# Patient Record
Sex: FEMALE | Race: AMERICAN INDIAN OR ALASKA NATIVE
[De-identification: names, ages, dates, MRNs, and addresses within clinical notes are randomized per-mention and may not be internally consistent; named-entity substitution may affect disease eponyms.]

---

## 2018-01-21 ENCOUNTER — HOSPITAL ENCOUNTER (EMERGENCY)
Dept: HOSPITAL 43 - DL.ED | Age: 83
Discharge: HOME | End: 2018-01-21
Payer: MEDICARE

## 2018-01-21 VITALS — DIASTOLIC BLOOD PRESSURE: 87 MMHG | SYSTOLIC BLOOD PRESSURE: 154 MMHG

## 2018-01-21 DIAGNOSIS — Z88.8: ICD-10-CM

## 2018-01-21 DIAGNOSIS — K21.9: Primary | ICD-10-CM

## 2018-01-21 DIAGNOSIS — Z88.1: ICD-10-CM

## 2018-01-21 DIAGNOSIS — Z79.899: ICD-10-CM

## 2018-01-21 DIAGNOSIS — Z79.82: ICD-10-CM

## 2018-01-21 LAB
ANION GAP SERPL CALC-SCNC: 13.4 MMOL/L
CHLORIDE SERPL-SCNC: 103 MMOL/L (ref 101–111)
SODIUM SERPL-SCNC: 138 MMOL/L (ref 135–145)

## 2018-01-21 PROCEDURE — 93010 ELECTROCARDIOGRAM REPORT: CPT

## 2018-01-21 PROCEDURE — 85025 COMPLETE CBC W/AUTO DIFF WBC: CPT

## 2018-01-21 PROCEDURE — 36415 COLL VENOUS BLD VENIPUNCTURE: CPT

## 2018-01-21 PROCEDURE — 80053 COMPREHEN METABOLIC PANEL: CPT

## 2018-01-21 PROCEDURE — 84484 ASSAY OF TROPONIN QUANT: CPT

## 2018-01-21 PROCEDURE — 99285 EMERGENCY DEPT VISIT HI MDM: CPT

## 2018-01-21 PROCEDURE — 93005 ELECTROCARDIOGRAM TRACING: CPT

## 2018-01-21 NOTE — EDM.PDOC
Scribed by Catia Jorgensen 01/21/18 1504 for Daylin Stephens NP





ED HPI GENERAL MEDICAL PROBLEM





- General


Chief Complaint: Chest Pain


Stated Complaint: [id number] CHEST PAINS


Time Seen by Provider: 01/21/18 13:35


Source of Information: Reports: Patient, RN, RN Notes Reviewed


History Limitations: Reports: No Limitations





- History of Present Illness


INITIAL COMMENTS - FREE TEXT/NARRATIVE: 


Patient presents to ER with complaint of low blood sugar. She states she took 

glucose pill for elevated blood sugar, which gave her an upset stomach. Admits 

to chest pain and nausea. She denies any shortness of breath, vomiting, diarrhea

, cough, fever or chills. She states the pain has decreased since arrival. 


Onset: Today


Duration: Constant


Location: Reports: Chest


Quality: Reports: Ache


Severity: Moderate


Improves with: Reports: None


Worsens with: Reports: None


Associated Symptoms: Reports: No Other Symptoms


  ** Mid-Sternal Epigastric


Pain Score (Numeric/FACES): 1





- Related Data


 Allergies











Allergy/AdvReac Type Severity Reaction Status Date / Time


 


atorvastatin calcium Allergy  Swelling Verified 04/16/15 06:44





[From Lipitor]     


 


erythromycin lactobionate Allergy  unknown Verified 04/16/15 06:44





[From Erythrocin]     


 


lisinopril Allergy  Cough Verified 04/16/15 06:44


 


fluoroquinolones Allergy  Swelling Uncoded 09/22/14 14:51











Home Meds: 


 Home Meds





Aspirin [Opal Chewable Aspirin] 81 mg PO DAILY 09/22/14 [History]


Bumetanide [Bumex] 1 mg PO DAILY 09/22/14 [History]


Fenofibric Acid (Choline) [Fenofibric Acid] 45 mg PO DAILY 09/22/14 [History]


Ferrous Gluconate 325 mg PO DAILY 09/22/14 [History]


Isosorbide Mononitrate [Isosorbide Mononitrate ER] 30 mg PO BID 09/22/14 [

History]


Levothyroxine [Synthroid] 50 mcg PO DAILY 09/22/14 [History]


Ranitidine HCl 150 mg PO BID PRN 09/22/14 [History]


Sertraline HCl 50 mg PO DAILY 09/22/14 [History]


amLODIPine [Norvasc] 5 mg PO DAILY 09/22/14 [History]


Insulin Detemir [Levemir Flextouch] 10 units INJECT .EVENING 04/16/15 [History]


Multivitamin [Multi-Vitamin Daily] 1 tab PO DAILY 04/16/15 [History]











Social & Family History





- Family History


Family Medical History: Noncontributory





- Tobacco Use


Smoking Status *Q: Never Smoker


Used Tobacco, but Quit: No


Second Hand Smoke Exposure: No





- Caffeine Use


Caffeine Use: Reports: Soda, Tea





- Alcohol Use


Days Per Week of Alcohol Use: 0





- Recreational Drug Use


Recreational Drug Use: No


Drug Use in Last 12 Months: No





- Living Situation & Occupation


Living situation: Reports: with Family


Occupation: Retired





ED ROS GENERAL





- Review of Systems


Review Of Systems: ROS reveals no pertinent complaints other than HPI.





ED EXAM, GENERAL





- Physical Exam


Exam: See Below


Exam Limited By: No Limitations


General Appearance: Alert, WD/WN, No Apparent Distress


Eye Exam: Bilateral Eye: Normal Inspection


Ears: Normal External Exam, Normal Canal, Hearing Grossly Normal, Normal TMs


Nose: Normal Inspection, Normal Mucosa, No Blood


Throat/Mouth: Normal Inspection, Normal Lips, Normal Teeth, Normal Gums, Normal 

Oropharynx, Normal Voice, No Airway Compromise


Head: Atraumatic, Normocephalic


Neck: Normal Inspection, Supple, Non-Tender, Full Range of Motion


Respiratory/Chest: No Respiratory Distress, Lungs Clear, Normal Breath Sounds, 

No Accessory Muscle Use, Chest Non-Tender


Cardiovascular: Normal Peripheral Pulses, Regular Rate, Rhythm, No Edema, No 

Gallop, No JVD, No Murmur, No Rub


GI/Abdominal: Normal Bowel Sounds, Soft, Non-Tender, No Organomegaly, No 

Distention, No Abnormal Bruit, No Mass


 (Female) Exam: Deferred


Rectal (Female) Exam: Deferred


Back Exam: Normal Inspection, Full Range of Motion, NT


Extremities: Normal Inspection, Normal Range of Motion, Non-Tender, Normal 

Capillary Refill, No Pedal Edema


Neurological: Alert, Oriented, CN II-XII Intact, Normal Cognition, Normal Gait, 

Normal Reflexes, No Motor/Sensory Deficits


Psychiatric: Normal Affect, Normal Mood


Skin Exam: Warm, Dry, Intact, Normal Color, No Rash


Lymphatic: No Adenopathy





EKG INTERPRETATION


EKG Date: 01/21/18


Time: 12:45


Rhythm: Other (sinus rhythm)


Rate (Beats/Min): 77


EKG Interpretation Comments: 


Left axis deviation. LVH with secondary repolarization abnormality. 





Course





- Vital Signs


Last Recorded V/S: 


 Last Vital Signs











Temp  97.2 F   01/21/18 12:56


 


Pulse  78   01/21/18 12:56


 


Resp  22 H  01/21/18 12:56


 


BP  154/87 H  01/21/18 12:56


 


Pulse Ox  91 L  01/21/18 12:56














- Orders/Labs/Meds


Orders: 


 Active Orders 24 hr











 Category Date Time Status


 


 EKG Documentation Completion [RC] STAT Care  01/21/18 13:42 Active


 


 Peripheral IV Care [RC] .AS DIRECTED Care  01/21/18 13:42 Active


 


 Sodium Chloride 0.9% [Saline Flush] Med  01/21/18 13:41 Active





 10 ml FLUSH ASDIRECTED PRN   


 


 Peripheral IV Insertion Adult [OM.PC] Stat Oth  01/21/18 13:41 Ordered








 Medication Orders





Sodium Chloride (Saline Flush)  10 ml FLUSH ASDIRECTED PRN


   PRN Reason: Keep Vein Open


   Last Admin: 01/21/18 13:49  Dose: 10 ml








Labs: 


 Laboratory Tests











  01/21/18 01/21/18 Range/Units





  12:52 12:52 


 


WBC   11.7 H  (5.0-10.0)  10^3/uL


 


RBC   4.42  (4.2-5.4)  10^6/uL


 


Hgb   13.7  (12.0-16.0)  g/dL


 


Hct   42.6  (37.0-47.0)  %


 


MCV   96.4  ()  fL


 


MCH   31.0  (27.0-34.0)  pg


 


MCHC   32.2 L  (33.0-35.0)  g/dL


 


Plt Count   226  (150-450)  10^3/uL


 


Neut % (Auto)   79.6 H  (42.2-75.2)  %


 


Lymph % (Auto)   14.4 L  (20.5-50.1)  %


 


Mono % (Auto)   4.9  (2-8)  %


 


Eos % (Auto)   0.9 L  (1.0-3.0)  %


 


Baso % (Auto)   0.2  (0.0-1.0)  %


 


Sodium  138   (135-145)  mmol/L


 


Potassium  4.4   (3.6-5.0)  mmol/L


 


Chloride  103   (101-111)  mmol/L


 


Carbon Dioxide  26.0   (21.0-31.0)  mmol/L


 


Anion Gap  13.4   


 


BUN  16   (7-18)  mg/dL


 


Creatinine  0.9   (0.6-1.3)  mg/dL


 


Est Cr Clr Drug Dosing  39.63   mL/min


 


Estimated GFR (MDRD)  59   


 


BUN/Creatinine Ratio  17.77   


 


Glucose  225 H   ()  mg/dL


 


Calcium  9.2   (8.4-10.2)  mg/dl


 


Total Bilirubin  0.7   (0.2-1.0)  mg/dL


 


AST  28   (10-42)  IU/L


 


ALT  17   (10-60)  IU/L


 


Alkaline Phosphatase  54   ()  IU/L


 


Troponin I  < 0.02   (0.00-0.02)  ng/ml


 


Total Protein  7.8   (6.7-8.2)  g/dl


 


Albumin  4.2   (3.2-5.5)  g/dl


 


Globulin  3.6   


 


Albumin/Globulin Ratio  1.17   











Meds: 


Medications











Generic Name Dose Route Start Last Admin





  Trade Name Freq  PRN Reason Stop Dose Admin


 


Sodium Chloride  10 ml  01/21/18 13:41  01/21/18 13:49





  Saline Flush  FLUSH   10 ml





  ASDIRECTED PRN   Administration





  Keep Vein Open   














Discontinued Medications














Generic Name Dose Route Start Last Admin





  Trade Name Freq  PRN Reason Stop Dose Admin


 


Al Hydroxide/Mg Hydroxide  30 ml  01/21/18 14:48  01/21/18 14:58





  Gi Cocktail  PO  01/21/18 14:49  30 ml





  ONETIME ONE   Administration














Departure





- Departure


Time of Disposition: 15:02


Disposition: Home, Self-Care 01


Condition: Fair


Clinical Impression: 


Acid reflux


Qualifiers:


 Esophagitis presence: without esophagitis Qualified Code(s): K21.9 - Gastro-

esophageal reflux disease without esophagitis





Instructions:  Nonspecific Chest Pain, Easy-to-Read, Indigestion, Easy-to-Read, 

Heartburn, Easy-to-Read


Forms:  ED Department Discharge


Additional Instructions: 


May use over the counter pepcid ac as directed for indigestion or heartburn as 

directed 


Follow up with your primary care facility


Avoid spicy foods, caffeine, coffee, chocolate and other irritants. 





- My Orders


Last 24 Hours: 


My Active Orders





01/21/18 13:41


Sodium Chloride 0.9% [Saline Flush]   10 ml FLUSH ASDIRECTED PRN 


Peripheral IV Insertion Adult [OM.PC] Stat 





01/21/18 13:42


EKG Documentation Completion [RC] STAT 


Peripheral IV Care [RC] .AS DIRECTED 














- Assessment/Plan


Last 24 Hours: 


My Active Orders





01/21/18 13:41


Sodium Chloride 0.9% [Saline Flush]   10 ml FLUSH ASDIRECTED PRN 


Peripheral IV Insertion Adult [OM.PC] Stat 





01/21/18 13:42


EKG Documentation Completion [RC] STAT 


Peripheral IV Care [RC] .AS DIRECTED 














I have read and agree with the documentation that has been completed regarding 

this visit. By signing this record, I attest that the documentation was 

completed in my physical presence and is an accurate record of the encounter.

## 2018-01-23 NOTE — EKG
01/21/2018 - MICHEAL SALGUERO -

 

FINDINGS:  EKG per my reading shows sinus rhythm with LVH.

 

DD:  01/23/2018 12:53:59

DT:  01/23/2018 13:06:38

[facility]

Job #:  [id number]

## 2019-12-14 NOTE — EDM.PDOC
Scribed by Catia Jorgensen 12/14/19 [id number] for Eunice Vora NP





ED HPI GENERAL MEDICAL PROBLEM





- General


Chief Complaint: General


Stated Complaint: AMBULANCE


Time Seen by Provider: 12/14/19 14:21


Source of Information: Reports: Patient, EMS, EMS Notes Reviewed, RN, RN Notes 

Reviewed


History Limitations: Reports: No Limitations





- History of Present Illness


INITIAL COMMENTS - FREE TEXT/NARRATIVE: 


Patient presents to ER by [facility]. Patient does not know 

if her blood sugar is elevated. She is weak x 1 days. She has had decreased 

appetite. She has had no cough, fever, or urinary symptoms. No recent illness. 

She is on Levemir 22 units in the A.M. Her son gives her all her insulin and 

meds; they dont check her BS. Son was knowledgeable about her Levemir. 


NO cough cold or SOB. NO abdominal pain, polyuria, frequency or dysuria.  


Onset: Gradual


Duration: Getting Worse


Location: Reports: Generalized


Quality: Reports: Ache


Severity: Moderate


Improves with: Reports: None


Worsens with: Reports: None


Associated Symptoms: Reports: No Other Symptoms





- Related Data


 Allergies











Allergy/AdvReac Type Severity Reaction Status Date / Time


 


atorvastatin calcium Allergy  Swelling Verified 12/14/19 14:38





[From Lipitor]     


 


erythromycin lactobionate Allergy  unknown Verified 12/14/19 14:38





[From Erythrocin]     


 


lisinopril Allergy  Cough Verified 12/14/19 14:38


 


metformin Allergy  Itching Verified 12/14/19 14:38


 


fluoroquinolones Allergy  Swelling Uncoded 12/05/19 13:52











Home Meds: 


 Home Meds





Aspirin [Opal Chewable Aspirin] 81 mg PO DAILY 09/22/14 [History]


Bumetanide [Bumex] 1 mg PO DAILY 09/22/14 [History]


Fenofibric Acid (Choline) [Fenofibric Acid] 45 mg PO DAILY 09/22/14 [History]


Ferrous Gluconate 325 mg PO DAILY 09/22/14 [History]


Isosorbide Mononitrate [Isosorbide Mononitrate ER] 15 mg PO BID 09/22/14 [

History]


Levothyroxine [Synthroid] 0.075 mg PO DAILY 09/22/14 [History]


Ranitidine HCl 150 mg PO BID PRN 09/22/14 [History]


Sertraline HCl 50 mg PO DAILY 09/22/14 [History]


amLODIPine [Norvasc] 5 mg PO DAILY 09/22/14 [History]


Insulin Detemir [Levemir Flextouch] 22 units INJECT .MORNINGS 04/16/15 [History]


Multivitamin [Multi-Vitamin Daily] 1 tab PO DAILY 04/16/15 [History]


Gabapentin [Neurontin] 300 mg PO BID 11/08/18 [History]


Ibuprofen 400 mg PO ATDISCHARGE PRN 11/08/18 [History]


atorvaSTATin [Lipitor] 20 mg PO BEDTIME 11/08/18 [History]


Donepezil HCl 10 mg PO DAILY 12/05/19 [History]


Furosemide 10 mg PO BID 12/05/19 [History]


hydroCHLOROthiazide [Hydrochlorothiazide] 12.5 mg PO DAILY 12/05/19 [History]











Past Medical History


HEENT History: Reports: Cataract, Glaucoma, Impaired Vision, Other (See Below)


Cardiovascular History: Reports: High Cholesterol, Hypertension


Respiratory History: Reports: None


Gastrointestinal History: Reports: GERD, Other (See Below)


Genitourinary History: Reports: Chronic Renal Insuffiency


OB/GYN History: Reports: Pregnancy


Musculoskeletal History: Reports: Back Pain, Chronic, Osteoarthritis, Other (

See Below)


Other Musculoskeletal History: right ankle surgery. Hip joint pain bilateral


Neurological History: Reports: None


Psychiatric History: Reports: Depression


Endocrine/Metabolic History: Reports: Diabetes, Type II, Hypothyroidism, Obesity

/BMI 30+


Hematologic History: Reports: None


Immunologic History: Reports: None


Oncologic (Cancer) History: Reports: None


Dermatologic History: Reports: None





- Infectious Disease History


Infectious Disease History: Reports: None





- Past Surgical History


HEENT Surgical History: Reports: Cataract Surgery, Oral Surgery, Tonsillectomy


GI Surgical History: Reports: Cholecystectomy, Colonoscopy, EGD


Female  Surgical History: Reports: Tubal Ligation





Social & Family History





- Family History


Family Medical History: Noncontributory





- Tobacco Use


Smoking Status *Q: Never Smoker





- Caffeine Use


Caffeine Use: Reports: Coffee





- Recreational Drug Use


Recreational Drug Use: No





- Living Situation & Occupation


Living situation: Reports: with Family


Occupation: Retired





ED ROS GENERAL





- Review of Systems


Review Of Systems: Comprehensive ROS is negative, except as noted in HPI.





ED EXAM, GENERAL





- Physical Exam


Exam: See Below


Exam Limited By: No Limitations


General Appearance: Alert, WD/WN, No Apparent Distress


Eye Exam: Bilateral Eye: Normal Inspection


Ears: Normal External Exam, Normal Canal, Hearing Grossly Normal, Normal TMs


Nose: Normal Inspection, Normal Mucosa, No Blood


Throat/Mouth: Normal Inspection, Normal Lips, Normal Teeth, Normal Gums, Normal 

Oropharynx, Normal Voice, No Airway Compromise


Head: Atraumatic, Normocephalic


Neck: Normal Inspection, Supple, Non-Tender, Full Range of Motion


Respiratory/Chest: No Respiratory Distress, Lungs Clear, Normal Breath Sounds, 

No Accessory Muscle Use, Chest Non-Tender


Cardiovascular: Normal Peripheral Pulses, Regular Rate, Rhythm, No Edema, No 

Gallop, No JVD, No Murmur, No Rub


GI/Abdominal: Normal Bowel Sounds, Soft, Non-Tender, No Organomegaly, No 

Distention, No Abnormal Bruit, No Mass


 (Female) Exam: Deferred


Rectal (Female) Exam: Deferred


Back Exam: Normal Inspection, Full Range of Motion, NT


Extremities: Normal Inspection, Normal Range of Motion, Non-Tender, Normal 

Capillary Refill, No Pedal Edema


Neurological: Alert, Oriented, CN II-XII Intact, Normal Cognition, Normal Gait, 

Normal Reflexes, No Motor/Sensory Deficits


Psychiatric: Normal Affect, Normal Mood


Skin Exam: Warm, Dry, Intact, Normal Color, No Rash





Course





- Vital Signs


Last Recorded V/S: 


 Last Vital Signs











Temp  36.1 C   12/14/19 14:07


 


Pulse  64   12/14/19 14:07


 


Resp  22 H  12/14/19 14:07


 


BP  146/69 H  12/14/19 14:07


 


Pulse Ox  95   12/14/19 14:07














- Orders/Labs/Meds


Orders: 


 Active Orders 24 hr











 Category Date Time Status


 


 Blood Glucose Check, Bedside [RC] ONETIME Care  12/14/19 15:43 Active


 


 Late Tray [DIET] Routine Diet  12/14/19 17:16 Active


 


 CULTURE BLOOD [BC] Stat Lab  12/14/19 14:08 Received











Labs: 


 Laboratory Tests











  12/14/19 12/14/19 12/14/19 Range/Units





  14:08 14:08 14:08 


 


WBC  8.0    (5.0-10.0)  10^3/uL


 


RBC  4.04 L    (4.2-5.4)  10^6/uL


 


Hgb  12.6    (12.0-16.0)  g/dL


 


Hct  38.3    (37.0-47.0)  %


 


MCV  94.8    ()  fL


 


MCH  31.2    (27.0-34.0)  pg


 


MCHC  32.9 L    (33.0-35.0)  g/dL


 


Plt Count  194    (150-450)  10^3/uL


 


Neut % (Auto)  71.2    (42.2-75.2)  %


 


Lymph % (Auto)  18.6 L    (20.5-50.1)  %


 


Mono % (Auto)  7.6    (2-8)  %


 


Eos % (Auto)  2.1    (1.0-3.0)  %


 


Baso % (Auto)  0.5    (0.0-1.0)  %


 


Sodium   135   (135-145)  mmol/L


 


Potassium   4.6   (3.6-5.0)  mmol/L


 


Chloride   99 L   (101-111)  mmol/L


 


Carbon Dioxide   27.0   (21.0-31.0)  mmol/L


 


Anion Gap   13.6   


 


BUN   26 H   (7-18)  mg/dL


 


Creatinine   0.9   (0.6-1.3)  mg/dL


 


Est Cr Clr Drug Dosing   38.13   mL/min


 


Estimated GFR (MDRD)   59   


 


BUN/Creatinine Ratio   28.88   


 


Glucose   422 H*   ()  mg/dL


 


POC Glucose     ()  mg/dl


 


Lactic Acid    2.0  (0.5-2.2)  mmol/L


 


Calcium   9.1   (8.4-10.2)  mg/dl


 


Total Bilirubin   0.5   (0.2-1.0)  mg/dL


 


AST   16   (10-42)  IU/L


 


ALT   16   (10-60)  IU/L


 


Alkaline Phosphatase   61   ()  IU/L


 


Total Protein   7.0   (6.7-8.2)  g/dl


 


Albumin   3.9   (3.2-5.5)  g/dl


 


Globulin   3.1   


 


Albumin/Globulin Ratio   1.26   


 


Urine Color     (YELLOW)  


 


Urine Appearance     (CLEAR)  


 


Urine pH     (5.0-9.0)  


 


Ur Specific Gravity     (1.005-1.030)  


 


Urine Protein     (NEGATIVE)  


 


Urine Glucose (UA)     (NEGATIVE)  


 


Urine Ketones     (NEGATIVE)  


 


Urine Occult Blood     (NEGATIVE)  


 


Urine Nitrite     (NEGATIVE)  


 


Urine Bilirubin     (NEGATIVE)  


 


Urine Urobilinogen     (0.2-1.0)  mg/dL


 


Ur Leukocyte Esterase     (NEGATIVE)  














  12/14/19 12/14/19 12/14/19 Range/Units





  14:09 15:04 15:42 


 


WBC     (5.0-10.0)  10^3/uL


 


RBC     (4.2-5.4)  10^6/uL


 


Hgb     (12.0-16.0)  g/dL


 


Hct     (37.0-47.0)  %


 


MCV     ()  fL


 


MCH     (27.0-34.0)  pg


 


MCHC     (33.0-35.0)  g/dL


 


Plt Count     (150-450)  10^3/uL


 


Neut % (Auto)     (42.2-75.2)  %


 


Lymph % (Auto)     (20.5-50.1)  %


 


Mono % (Auto)     (2-8)  %


 


Eos % (Auto)     (1.0-3.0)  %


 


Baso % (Auto)     (0.0-1.0)  %


 


Sodium     (135-145)  mmol/L


 


Potassium     (3.6-5.0)  mmol/L


 


Chloride     (101-111)  mmol/L


 


Carbon Dioxide     (21.0-31.0)  mmol/L


 


Anion Gap     


 


BUN     (7-18)  mg/dL


 


Creatinine     (0.6-1.3)  mg/dL


 


Est Cr Clr Drug Dosing     mL/min


 


Estimated GFR (MDRD)     


 


BUN/Creatinine Ratio     


 


Glucose     ()  mg/dL


 


POC Glucose  375 H   301 H  ()  mg/dl


 


Lactic Acid     (0.5-2.2)  mmol/L


 


Calcium     (8.4-10.2)  mg/dl


 


Total Bilirubin     (0.2-1.0)  mg/dL


 


AST     (10-42)  IU/L


 


ALT     (10-60)  IU/L


 


Alkaline Phosphatase     ()  IU/L


 


Total Protein     (6.7-8.2)  g/dl


 


Albumin     (3.2-5.5)  g/dl


 


Globulin     


 


Albumin/Globulin Ratio     


 


Urine Color   Yellow   (YELLOW)  


 


Urine Appearance   Clear   (CLEAR)  


 


Urine pH   5.5   (5.0-9.0)  


 


Ur Specific Gravity   1.015   (1.005-1.030)  


 


Urine Protein   Negative   (NEGATIVE)  


 


Urine Glucose (UA)   500 H   (NEGATIVE)  


 


Urine Ketones   Negative   (NEGATIVE)  


 


Urine Occult Blood   Negative   (NEGATIVE)  


 


Urine Nitrite   Negative   (NEGATIVE)  


 


Urine Bilirubin   Negative   (NEGATIVE)  


 


Urine Urobilinogen   0.2   (0.2-1.0)  mg/dL


 


Ur Leukocyte Esterase   Negative   (NEGATIVE)  














  12/14/19 Range/Units





  16:25 


 


WBC   (5.0-10.0)  10^3/uL


 


RBC   (4.2-5.4)  10^6/uL


 


Hgb   (12.0-16.0)  g/dL


 


Hct   (37.0-47.0)  %


 


MCV   ()  fL


 


MCH   (27.0-34.0)  pg


 


MCHC   (33.0-35.0)  g/dL


 


Plt Count   (150-450)  10^3/uL


 


Neut % (Auto)   (42.2-75.2)  %


 


Lymph % (Auto)   (20.5-50.1)  %


 


Mono % (Auto)   (2-8)  %


 


Eos % (Auto)   (1.0-3.0)  %


 


Baso % (Auto)   (0.0-1.0)  %


 


Sodium   (135-145)  mmol/L


 


Potassium   (3.6-5.0)  mmol/L


 


Chloride   (101-111)  mmol/L


 


Carbon Dioxide   (21.0-31.0)  mmol/L


 


Anion Gap   


 


BUN   (7-18)  mg/dL


 


Creatinine   (0.6-1.3)  mg/dL


 


Est Cr Clr Drug Dosing   mL/min


 


Estimated GFR (MDRD)   


 


BUN/Creatinine Ratio   


 


Glucose   ()  mg/dL


 


POC Glucose  233 H  ()  mg/dl


 


Lactic Acid   (0.5-2.2)  mmol/L


 


Calcium   (8.4-10.2)  mg/dl


 


Total Bilirubin   (0.2-1.0)  mg/dL


 


AST   (10-42)  IU/L


 


ALT   (10-60)  IU/L


 


Alkaline Phosphatase   ()  IU/L


 


Total Protein   (6.7-8.2)  g/dl


 


Albumin   (3.2-5.5)  g/dl


 


Globulin   


 


Albumin/Globulin Ratio   


 


Urine Color   (YELLOW)  


 


Urine Appearance   (CLEAR)  


 


Urine pH   (5.0-9.0)  


 


Ur Specific Gravity   (1.005-1.030)  


 


Urine Protein   (NEGATIVE)  


 


Urine Glucose (UA)   (NEGATIVE)  


 


Urine Ketones   (NEGATIVE)  


 


Urine Occult Blood   (NEGATIVE)  


 


Urine Nitrite   (NEGATIVE)  


 


Urine Bilirubin   (NEGATIVE)  


 


Urine Urobilinogen   (0.2-1.0)  mg/dL


 


Ur Leukocyte Esterase   (NEGATIVE)  











Meds: 


Medications














Discontinued Medications














Generic Name Dose Route Start Last Admin





  Trade Name Kaity  PRN Reason Stop Dose Admin


 


Insulin Human Regular  5 unit  12/14/19 14:57  12/14/19 15:11





  Humulin R  IV  12/14/19 14:58  5 unit





  ONETIME ONE   Administration





     





     





     





     


 


Insulin Human Regular  4 unit  12/14/19 16:15  12/14/19 16:29





  Humulin R  SUBCUT  12/14/19 16:16  Not Given





  ONETIME ONE   





     





     





     





     


 


Insulin Human Regular  2 unit  12/14/19 16:27  12/14/19 16:29





  Humulin R  SUBCUT  12/14/19 16:28  2 unit





  ONETIME ONE   Administration





     





     





     





     














- Re-Assessments/Exams


Free Text/Narrative Re-Assessment/Exam: 





12/14/19 20:23


No signs of infection on labs, exam normal. Had not identified increased carb 

intake


Most likely she is just requiring more insulin and with them not taking her BS; 

may not have noted her BS rising. 


Upon admit her BS was 422 given 5 units IV reg insulin. After 2 hours her BS 

down to 233; given 2 units sq. 


Will increase her Levemir to 26 units and to follow up with her PCP next week. 

Son understood and so did the patient. Encouraged them to check BS bid and 

report to the PCP. Also discussed sx of low blood sugar. 





Departure





- Departure


Time of Disposition: 16:17


Disposition: Home, Self-Care 01


Condition: Good


Clinical Impression: 


 Hyperglycemia, IDDM (insulin dependent diabetes mellitus)








- Discharge Information


Instructions:  Type 2 Diabetes Mellitus, Diagnosis, Adult, Insulin Treatment 

for Diabetes Mellitus, Tips for Eating Away From Home If You Have Diabetes, 

Type 2 Diabetes Mellitus, Self Care, Adult, Blood Glucose Monitoring, Adult


Referrals: 


PCP,None [Primary Care Provider] - 


Forms:  ED Department Discharge


Additional Instructions: 


Would be monitoring her blood sugar twice a day; keep a log for your primary 

care. Make appointment as soon as possible. we gave you some insulin in the ER 

as your blood sugar was increased to 422. 


We will need to increase your Levemir to 26 units every am. If any issues with 

low blood sugars < 100; then hold Levemir and call you PCP or go to the ER. 


We did not find any reasons for your high blood sugar. You blood work is good 

and no infection in urine. 





Sepsis Event Note





- Evaluation


Sepsis Screening Result: No Definite Risk





- Focused Exam


Vital Signs: 


 Vital Signs











  Temp Pulse Resp BP Pulse Ox


 


 12/14/19 14:07  36.1 C  64  22 H  146/69 H  95











Date Exam was Performed: 12/14/19


Time Exam was Performed: 20:21





- My Orders


Last 24 Hours: 


My Active Orders





12/14/19 14:08


CULTURE BLOOD [BC] Stat 





12/14/19 15:43


Blood Glucose Check, Bedside [RC] ONETIME 





12/14/19 17:16


Late Tray [DIET] Routine 














- Assessment/Plan


Last 24 Hours: 


My Active Orders





12/14/19 14:08


CULTURE BLOOD [BC] Stat 





12/14/19 15:43


Blood Glucose Check, Bedside [RC] ONETIME 





12/14/19 17:16


Late Tray [DIET] Routine 














I have read and agree with the documentation that has been completed regarding 

this visit. By signing this record, I attest that the documentation was 

completed in my physical presence and is an accurate record of the encounter.

## 2019-12-31 NOTE — EDM.PDOC
Scribed by Catia Jorgensen 12/31/19 0610 for Gautam Adame PA





ED HPI GENERAL MEDICAL PROBLEM





- General


Chief Complaint: General


Stated Complaint: DIABETIC COMPLAINT


Time Seen by Provider: 12/31/19 12:47


Source of Information: Reports: Patient, EMS, EMS Notes Reviewed, RN, RN Notes 

Reviewed


History Limitations: Reports: No Limitations





- History of Present Illness


INITIAL COMMENTS - FREE TEXT/NARRATIVE: 


Patient is an 89-year-old who presents to ER by [facility] 

who woke up feeling well. She then got a headache and diffuse abdominal pain. 

She has taken no medications for her pain. She did take her regular 

medications. She had a clinic visit weeks ago. She has had no vomiting. Patient 

had a normal bowel movement movement with no urinary complaints. 


Onset: Today


Duration: Getting Worse


Location: Reports: Head, Abdomen


Quality: Reports: Ache


Severity: Moderate


Improves with: Reports: None


Worsens with: Reports: None


Associated Symptoms: Reports: No Other Symptoms





- Related Data


 Allergies











Allergy/AdvReac Type Severity Reaction Status Date / Time


 


atorvastatin calcium Allergy  Swelling Verified 12/14/19 14:38





[From Lipitor]     


 


erythromycin lactobionate Allergy  unknown Verified 12/14/19 14:38





[From Erythrocin]     


 


lisinopril Allergy  Cough Verified 12/14/19 14:38


 


metformin Allergy  Itching Verified 12/14/19 14:38


 


fluoroquinolones Allergy  Swelling Uncoded 12/05/19 13:52











Home Meds: 


 Home Meds





Aspirin [Opal Chewable Aspirin] 81 mg PO DAILY 09/22/14 [History]


Bumetanide [Bumex] 1 mg PO DAILY 09/22/14 [History]


Fenofibric Acid (Choline) [Fenofibric Acid] 45 mg PO DAILY 09/22/14 [History]


Ferrous Gluconate 325 mg PO DAILY 09/22/14 [History]


Isosorbide Mononitrate [Isosorbide Mononitrate ER] 15 mg PO BID 09/22/14 [

History]


Levothyroxine [Synthroid] 0.075 mg PO DAILY 09/22/14 [History]


Ranitidine HCl 150 mg PO BID PRN 09/22/14 [History]


Sertraline HCl 50 mg PO DAILY 09/22/14 [History]


amLODIPine [Norvasc] 5 mg PO DAILY 09/22/14 [History]


Insulin Detemir [Levemir Flextouch] 22 units INJECT .MORNINGS 04/16/15 [History]


Multivitamin [Multi-Vitamin Daily] 1 tab PO DAILY 04/16/15 [History]


Gabapentin [Neurontin] 300 mg PO BID 11/08/18 [History]


Ibuprofen 400 mg PO ATDISCHARGE PRN 11/08/18 [History]


atorvaSTATin [Lipitor] 20 mg PO BEDTIME 11/08/18 [History]


Donepezil HCl 10 mg PO DAILY 12/05/19 [History]


Furosemide 10 mg PO BID 12/05/19 [History]


hydroCHLOROthiazide [Hydrochlorothiazide] 12.5 mg PO DAILY 12/05/19 [History]











Past Medical History


HEENT History: Reports: Cataract, Glaucoma, Impaired Vision, Other (See Below)


Cardiovascular History: Reports: High Cholesterol, Hypertension


Respiratory History: Reports: None


Gastrointestinal History: Reports: GERD, Other (See Below)


Genitourinary History: Reports: Chronic Renal Insuffiency


OB/GYN History: Reports: Pregnancy


Musculoskeletal History: Reports: Back Pain, Chronic, Osteoarthritis, Other (

See Below)


Other Musculoskeletal History: right ankle surgery. Hip joint pain bilateral


Neurological History: Reports: None


Psychiatric History: Reports: Depression


Endocrine/Metabolic History: Reports: Diabetes, Type II, Hypothyroidism, Obesity

/BMI 30+


Hematologic History: Reports: None


Immunologic History: Reports: None


Oncologic (Cancer) History: Reports: None


Dermatologic History: Reports: None





- Infectious Disease History


Infectious Disease History: Reports: None





- Past Surgical History


HEENT Surgical History: Reports: Cataract Surgery, Oral Surgery, Tonsillectomy


GI Surgical History: Reports: Cholecystectomy, Colonoscopy, EGD


Female  Surgical History: Reports: Tubal Ligation





Social & Family History





- Family History


Family Medical History: Noncontributory





- Caffeine Use


Caffeine Use: Reports: Coffee





- Living Situation & Occupation


Living situation: Reports: with Family


Occupation: Retired





ED ROS GENERAL





- Review of Systems


Review Of Systems: Comprehensive ROS is negative, except as noted in HPI.





ED EXAM, GENERAL





- Physical Exam


Exam: See Below


Exam Limited By: No Limitations


General Appearance: Obese


Eye Exam: Bilateral Eye: EOMI, Normal Inspection, PERRL


Ears: Normal External Exam, Normal Canal, Hearing Grossly Normal, Normal TMs


Nose: Normal Inspection, Normal Mucosa, No Blood


Throat/Mouth: Normal Inspection, Normal Lips, Normal Teeth, Normal Gums, Normal 

Oropharynx, Normal Voice, No Airway Compromise


Head: Atraumatic, Normocephalic


Neck: Normal Inspection, Supple, Non-Tender, Full Range of Motion


Respiratory/Chest: No Respiratory Distress, Lungs Clear, Normal Breath Sounds, 

No Accessory Muscle Use, Chest Non-Tender


Cardiovascular: Normal Peripheral Pulses, Regular Rate, Rhythm, No Edema, No 

Gallop, No JVD, No Murmur, No Rub


GI/Abdominal: Other (morbid obesity. Right sided abdominal pain)


 (Female) Exam: Deferred


Rectal (Female) Exam: Deferred


Back Exam: Normal Inspection, Full Range of Motion, NT


Extremities: Normal Inspection, Normal Range of Motion, Non-Tender, Normal 

Capillary Refill, No Pedal Edema


Neurological: Alert, Oriented, CN II-XII Intact, Normal Cognition, Normal Gait, 

Normal Reflexes, No Motor/Sensory Deficits


Psychiatric: Normal Affect, Normal Mood


Skin Exam: Warm, Dry, Intact, Normal Color, No Rash


Lymphatic: No Adenopathy





Course





- Vital Signs


Last Recorded V/S: 


 Last Vital Signs











Temp  36.1 C   12/31/19 12:42


 


Pulse  73   12/31/19 12:42


 


Resp  20   12/31/19 12:42


 


BP  119/41 L  12/31/19 12:42


 


Pulse Ox  88 L  12/31/19 12:42














- Orders/Labs/Meds


Labs: 


 Laboratory Tests











  12/31/19 12/31/19 12/31/19 Range/Units





  13:32 13:32 13:32 


 


WBC    8.5  (5.0-10.0)  10^3/uL


 


RBC    3.97 L  (4.2-5.4)  10^6/uL


 


Hgb    12.2  (12.0-16.0)  g/dL


 


Hct    37.5  (37.0-47.0)  %


 


MCV    94.5  ()  fL


 


MCH    30.7  (27.0-34.0)  pg


 


MCHC    32.5 L  (33.0-35.0)  g/dL


 


Plt Count    192  (150-450)  10^3/uL


 


Neut % (Auto)    70.2  (42.2-75.2)  %


 


Lymph % (Auto)    19.4 L  (20.5-50.1)  %


 


Mono % (Auto)    8.3 H  (2-8)  %


 


Eos % (Auto)    1.5  (1.0-3.0)  %


 


Baso % (Auto)    0.6  (0.0-1.0)  %


 


Sodium     (135-145)  mmol/L


 


Potassium     (3.6-5.0)  mmol/L


 


Chloride     (101-111)  mmol/L


 


Carbon Dioxide     (21.0-31.0)  mmol/L


 


Anion Gap     


 


BUN     (7-18)  mg/dL


 


Creatinine     (0.6-1.3)  mg/dL


 


Est Cr Clr Drug Dosing     mL/min


 


Estimated GFR (MDRD)     


 


BUN/Creatinine Ratio     


 


Glucose     ()  mg/dL


 


Calcium     (8.4-10.2)  mg/dl


 


Magnesium  1.6 L    (1.8-2.5)  mg/dL


 


Total Bilirubin     (0.2-1.0)  mg/dL


 


AST     (10-42)  IU/L


 


ALT     (10-60)  IU/L


 


Alkaline Phosphatase     ()  IU/L


 


Ammonia   23   (11-35)  umol/L


 


Total Protein     (6.7-8.2)  g/dl


 


Albumin     (3.2-5.5)  g/dl


 


Globulin     


 


Albumin/Globulin Ratio     


 


Amylase  37    ()  U/L


 


Lipase  29    (22-51)  U/L


 


Urine Color     (YELLOW)  


 


Urine Appearance     (CLEAR)  


 


Urine pH     (5.0-9.0)  


 


Ur Specific Gravity     (1.005-1.030)  


 


Urine Protein     (NEGATIVE)  


 


Urine Glucose (UA)     (NEGATIVE)  


 


Urine Ketones     (NEGATIVE)  


 


Urine Occult Blood     (NEGATIVE)  


 


Urine Nitrite     (NEGATIVE)  


 


Urine Bilirubin     (NEGATIVE)  


 


Urine Urobilinogen     (0.2-1.0)  mg/dL


 


Ur Leukocyte Esterase     (NEGATIVE)  














  12/31/19 12/31/19 Range/Units





  13:32 13:46 


 


WBC    (5.0-10.0)  10^3/uL


 


RBC    (4.2-5.4)  10^6/uL


 


Hgb    (12.0-16.0)  g/dL


 


Hct    (37.0-47.0)  %


 


MCV    ()  fL


 


MCH    (27.0-34.0)  pg


 


MCHC    (33.0-35.0)  g/dL


 


Plt Count    (150-450)  10^3/uL


 


Neut % (Auto)    (42.2-75.2)  %


 


Lymph % (Auto)    (20.5-50.1)  %


 


Mono % (Auto)    (2-8)  %


 


Eos % (Auto)    (1.0-3.0)  %


 


Baso % (Auto)    (0.0-1.0)  %


 


Sodium  135   (135-145)  mmol/L


 


Potassium  4.0   (3.6-5.0)  mmol/L


 


Chloride  99 L   (101-111)  mmol/L


 


Carbon Dioxide  24.0   (21.0-31.0)  mmol/L


 


Anion Gap  16.0   


 


BUN  28 H   (7-18)  mg/dL


 


Creatinine  1.1   (0.6-1.3)  mg/dL


 


Est Cr Clr Drug Dosing  27.42   mL/min


 


Estimated GFR (MDRD)  47   


 


BUN/Creatinine Ratio  25.45   


 


Glucose  265 H   ()  mg/dL


 


Calcium  8.6   (8.4-10.2)  mg/dl


 


Magnesium    (1.8-2.5)  mg/dL


 


Total Bilirubin  0.6   (0.2-1.0)  mg/dL


 


AST  19   (10-42)  IU/L


 


ALT  16   (10-60)  IU/L


 


Alkaline Phosphatase  54   ()  IU/L


 


Ammonia    (11-35)  umol/L


 


Total Protein  6.9   (6.7-8.2)  g/dl


 


Albumin  3.9   (3.2-5.5)  g/dl


 


Globulin  3.0   


 


Albumin/Globulin Ratio  1.30   


 


Amylase    ()  U/L


 


Lipase    (22-51)  U/L


 


Urine Color   Yellow  (YELLOW)  


 


Urine Appearance   Clear  (CLEAR)  


 


Urine pH   5.0  (5.0-9.0)  


 


Ur Specific Gravity   1.015  (1.005-1.030)  


 


Urine Protein   Negative  (NEGATIVE)  


 


Urine Glucose (UA)   Negative  (NEGATIVE)  


 


Urine Ketones   Negative  (NEGATIVE)  


 


Urine Occult Blood   Negative  (NEGATIVE)  


 


Urine Nitrite   Negative  (NEGATIVE)  


 


Urine Bilirubin   Negative  (NEGATIVE)  


 


Urine Urobilinogen   0.2  (0.2-1.0)  mg/dL


 


Ur Leukocyte Esterase   Negative  (NEGATIVE)  














Departure





- Departure


Time of Disposition: 14:45


Disposition: Home, Self-Care 01


Condition: Fair


Clinical Impression: 


 Gastroenteritis








- Discharge Information


*PRESCRIPTION DRUG MONITORING PROGRAM REVIEWED*: Not Applicable


*COPY OF PRESCRIPTION DRUG MONITORING REPORT IN PATIENT SHYAM: Not Applicable


Instructions:  Viral Gastroenteritis, Adult, Easy-to-Read


Forms:  ED Department Discharge


Care Plan Goals: 


The patient was advised of the examination and lab results during the visit. 

The patient was encouraged to stick to a BRAT diet (Bananas, Rice, Applesauce 

and Toast) for the next 24-48 hours with small frequent sips of fluid. If the 

patient has any additional symptoms or concerns, the patient should either 

return to the emergency department or visit her primary care facility. 





Sepsis Event Note





- Evaluation


Sepsis Screening Result: No Definite Risk





- Focused Exam


Vital Signs: 


 Vital Signs











  Temp Pulse Resp BP Pulse Ox


 


 12/31/19 12:42  36.1 C  73  20  119/41 L  88 L











Date Exam was Performed: 12/31/19


Time Exam was Performed: 14:45





I have read and agree with the documentation that has been completed regarding 

this visit. By signing this record, I attest that the documentation was 

completed in my physical presence and is an accurate record of the encounter.